# Patient Record
Sex: MALE | Race: WHITE
[De-identification: names, ages, dates, MRNs, and addresses within clinical notes are randomized per-mention and may not be internally consistent; named-entity substitution may affect disease eponyms.]

---

## 2024-03-13 ENCOUNTER — HOSPITAL ENCOUNTER (EMERGENCY)
Dept: HOSPITAL 87 - ER | Age: 54
Discharge: HOME | End: 2024-03-13
Payer: SELF-PAY

## 2024-03-13 VITALS — TEMPERATURE: 98.5 F

## 2024-03-13 VITALS — DIASTOLIC BLOOD PRESSURE: 61 MMHG | RESPIRATION RATE: 18 BRPM | HEART RATE: 66 BPM | SYSTOLIC BLOOD PRESSURE: 123 MMHG

## 2024-03-13 VITALS — OXYGEN SATURATION: 96 %

## 2024-03-13 VITALS — BODY MASS INDEX: 28.93 KG/M2 | HEIGHT: 73 IN | WEIGHT: 218.26 LBS

## 2024-03-13 DIAGNOSIS — F19.90: Primary | ICD-10-CM

## 2024-03-13 DIAGNOSIS — I10: ICD-10-CM

## 2024-03-13 LAB
ALBUMIN SERPL BCP-MCNC: 3.8 G/DL (ref 3.2–4.8)
ALT SERPL W P-5'-P-CCNC: 21 IU/L (ref 10–49)
AST SERPL W P-5'-P-CCNC: 23 IU/L (ref ?–34)
BASOPHILS NFR BLD AUTO: 0.5 % (ref 0–2)
BILIRUB SERPL-MCNC: 0.5 MG/DL (ref 0.1–1)
BUN SERPL-MCNC: 28 MG/DL (ref 9–23)
CALCIUM SERPL-MCNC: 8.1 MG/DL (ref 8.7–10.4)
CARDIAC TROPONIN I PNL SERPL HS: 5 NG/L (ref 3–53)
CHLORIDE SERPL-SCNC: 106 MEQ/L (ref 98–107)
CO2 SERPL-SCNC: 31 MEQ/L (ref 21–32)
CREAT SERPL-MCNC: 1 MG/DL (ref 0.6–1.3)
EOSINOPHIL NFR BLD AUTO: 2 % (ref 0–5)
ERYTHROCYTE [DISTWIDTH] IN BLOOD BY AUTOMATED COUNT: 14.2 % (ref 11.6–14.6)
ETHANOL SERPL-MCNC: < 10 MG/DL (ref ?–10)
GLUCOSE SERPL-MCNC: 92 MG/DL (ref 70–105)
HCT VFR BLD AUTO: 37.5 % (ref 42–52)
HGB BLD-MCNC: 12.8 G/DL (ref 14–18)
LYMPHOCYTES NFR BLD AUTO: 19.4 % (ref 20–50)
MCH RBC QN AUTO: 29.1 PG (ref 28–32)
MCHC RBC AUTO-ENTMCNC: 34.1 G/DL (ref 31–37)
MCV RBC AUTO: 85.4 FL (ref 80–94)
MONOCYTES NFR BLD AUTO: 6.5 % (ref 2–8)
NEUTROPHILS NFR BLD AUTO: 71.6 % (ref 40–76)
PLATELET # BLD AUTO: 163 X1000/UL (ref 130–400)
PMV BLD AUTO: 8.8 FL (ref 7.4–10.4)
POTASSIUM SERPL-SCNC: 3.6 MEQ/L (ref 3.5–5.1)
PROT SERPL-MCNC: 6 G/DL (ref 6–8.3)
RBC # BLD AUTO: 4.39 MILL/UL (ref 4.7–6.1)
SODIUM SERPL-SCNC: 140 MEQ/L (ref 136–145)
WBC # BLD: 7.1 X1000/UL (ref 4.5–11)

## 2024-03-13 PROCEDURE — 93005 ELECTROCARDIOGRAM TRACING: CPT

## 2024-03-13 PROCEDURE — 80320 DRUG SCREEN QUANTALCOHOLS: CPT

## 2024-03-13 PROCEDURE — 71045 X-RAY EXAM CHEST 1 VIEW: CPT

## 2024-03-13 PROCEDURE — 70450 CT HEAD/BRAIN W/O DYE: CPT

## 2024-03-13 PROCEDURE — G0480 DRUG TEST DEF 1-7 CLASSES: HCPCS

## 2024-03-13 PROCEDURE — 82962 GLUCOSE BLOOD TEST: CPT

## 2024-03-13 PROCEDURE — 84484 ASSAY OF TROPONIN QUANT: CPT

## 2024-03-13 PROCEDURE — 36415 COLL VENOUS BLD VENIPUNCTURE: CPT

## 2024-03-13 PROCEDURE — 80053 COMPREHEN METABOLIC PANEL: CPT

## 2024-03-13 PROCEDURE — 85025 COMPLETE CBC W/AUTO DIFF WBC: CPT

## 2024-03-13 PROCEDURE — 99285 EMERGENCY DEPT VISIT HI MDM: CPT

## 2024-03-20 ENCOUNTER — APPOINTMENT (OUTPATIENT)
Dept: RADIOLOGY | Facility: MEDICAL CENTER | Age: 54
End: 2024-03-20
Attending: EMERGENCY MEDICINE
Payer: COMMERCIAL

## 2024-03-20 ENCOUNTER — HOSPITAL ENCOUNTER (EMERGENCY)
Facility: MEDICAL CENTER | Age: 54
End: 2024-03-20
Attending: EMERGENCY MEDICINE
Payer: COMMERCIAL

## 2024-03-20 VITALS
BODY MASS INDEX: 33.8 KG/M2 | SYSTOLIC BLOOD PRESSURE: 157 MMHG | TEMPERATURE: 98 F | DIASTOLIC BLOOD PRESSURE: 89 MMHG | WEIGHT: 271.83 LBS | RESPIRATION RATE: 18 BRPM | HEART RATE: 69 BPM | HEIGHT: 75 IN | OXYGEN SATURATION: 96 %

## 2024-03-20 DIAGNOSIS — S80.02XA CONTUSION OF LEFT KNEE, INITIAL ENCOUNTER: ICD-10-CM

## 2024-03-20 DIAGNOSIS — S80.01XA CONTUSION OF RIGHT KNEE, INITIAL ENCOUNTER: ICD-10-CM

## 2024-03-20 DIAGNOSIS — F07.81 POST CONCUSSIVE SYNDROME: ICD-10-CM

## 2024-03-20 PROCEDURE — 99283 EMERGENCY DEPT VISIT LOW MDM: CPT | Mod: 25

## 2024-03-20 PROCEDURE — 70450 CT HEAD/BRAIN W/O DYE: CPT

## 2024-03-20 PROCEDURE — 73562 X-RAY EXAM OF KNEE 3: CPT | Mod: LT

## 2024-03-20 PROCEDURE — 73562 X-RAY EXAM OF KNEE 3: CPT | Mod: RT

## 2024-03-20 RX ORDER — DONEPEZIL HYDROCHLORIDE 5 MG/1
5 TABLET, FILM COATED ORAL NIGHTLY
Qty: 30 TABLET | Refills: 0 | Status: SHIPPED | OUTPATIENT
Start: 2024-03-20 | End: 2024-04-15

## 2024-03-20 NOTE — ED PROVIDER NOTES
ED Provider Note    CHIEF COMPLAINT  Chief Complaint   Patient presents with    GLF     Pt states he had a fall on 3/13 at the [facility] airport. His foot got stuck in a metal grate and hit his face and knees on the ground. Per family, +LOC for 20min during the incident.   Pt had a scan done at an outlying facility that was negative. Pt's wife has noticed changes in his personality and that the pt is forgetful.      Headache     Pt also endorses having headaches.      Knee Pain     Pt c/o bilateral knee pain since that incident.        EXTERNAL RECORDS REVIEWED  Records from [facility] reviewed, patient CT head at that time was negative.    HPI/ROS    OUTSIDE HISTORIAN(S):  Patient's wife who states that her has been was out of it for multiple minutes, and estimated 20 minutes after the fall.  She also reports he has not been acting like himself, with some mood changes.    Atul Blood Jr. is a 54 y.o. male who presents with ongoing headache after mechanical ground-level fall that occurred approximately 1 week ago while at [facility].  Patient was walking, got his foot stuck on something on the ground causing him to fall onto his bilateral knees and then onto his head.  He struck his head.  Wife reports that he was unconscious for a few minutes and then out of it for 20 minutes.  He was taken to a [facility] where a head CT was checked, and was negative for any bleed.  Patient was discharged home.  He reports he is having ongoing knee pain, he did not have any x-ray of this but has been ambulatory.  He has had a change in personality, reporting that he is significantly more irritable than normal.  He reports a ongoing frontal headache.  He feels simply, out of it.  He denies any associated neck or back pain.  He denies any focal weakness or numbness.  Patient denies any SI or HI.    PAST MEDICAL HISTORY   has a past medical history of Hypertension.    SURGICAL HISTORY  patient denies any  "surgical history    FAMILY HISTORY  History reviewed. No pertinent family history.    SOCIAL HISTORY  Social History     Tobacco Use    Smoking status: Never    Smokeless tobacco: Never   Vaping Use    Vaping Use: Never used   Substance and Sexual Activity    Alcohol use: Not Currently     Comment: rare    Drug use: Never    Sexual activity: Not on file       CURRENT MEDICATIONS  Home Medications       Reviewed by Ernestine Combs R.N. (Registered Nurse) on 03/20/24 at 1449  Med List Status: Not Addressed     Medication Last Dose Status        Patient Vishal Taking any Medications                           ALLERGIES  Allergies   Allergen Reactions    Benylin Cough [Diphenhydramine] Hives    Nickel Hives     Per pt, nickel \"eats\" his skin       PHYSICAL EXAM  VITAL SIGNS: BP (!) 151/100   Pulse 70   Temp 36.7 °C (98.1 °F) (Temporal)   Resp 16   Ht 1.905 m (6' 3\")   Wt 123 kg (271 lb 13.2 oz)   SpO2 96%   BMI 33.98 kg/m²    Physical Exam  Constitutional:       Appearance: Normal appearance.   HENT:      Head: Normocephalic.      Right Ear: Tympanic membrane normal.      Left Ear: Tympanic membrane normal.      Nose: Nose normal.      Mouth/Throat:      Mouth: Mucous membranes are moist.   Eyes:      Extraocular Movements: Extraocular movements intact.      Pupils: Pupils are equal, round, and reactive to light.   Cardiovascular:      Rate and Rhythm: Normal rate and regular rhythm.   Pulmonary:      Effort: Pulmonary effort is normal. No respiratory distress.      Breath sounds: Normal breath sounds. No stridor. No wheezing or rales.   Chest:      Chest wall: No tenderness.   Abdominal:      General: Abdomen is flat. There is no distension.      Palpations: Abdomen is soft. There is no mass.      Tenderness: There is no abdominal tenderness.   Musculoskeletal:      Cervical back: Normal range of motion.      Comments: Cervical, thoracic, lumbar spine clear of any tenderness to palpation.  Bilateral upper " extremities are unremarkable.  Bilateral knees with some tenderness of the patellas.  There is small overlying abrasions here which are healing well.  Full range of motion without significant pain evoked.   Skin:     General: Skin is warm.      Capillary Refill: Capillary refill takes less than 2 seconds.   Neurological:      General: No focal deficit present.      Mental Status: He is alert and oriented to person, place, and time.      Comments: Distal and proximal strength 5/5 in upper and lower extremities. Normal gait. No dysmetria. No sensation deficits. No visual field deficits. Cranial nerves intact.        Psychiatric:         Mood and Affect: Mood normal.           DIAGNOSTIC STUDIES / PROCEDURES      RADIOLOGY  I have independently interpreted the diagnostic imaging associated with this visit and am waiting the final reading from the radiologist.   My preliminary interpretation is as follows: Imaging is all unremarkable.  Radiologist interpretation:   CT-HEAD W/O   Final Result      Head CT without contrast within normal limits. No evidence of acute cerebral infarction, hemorrhage or mass lesion.         DX-KNEE 3 VIEWS RIGHT   Final Result      1.  No fracture detected.   2.  Knee osteoarthritis.      DX-KNEE 3 VIEWS LEFT   Final Result      Postoperative changes of total knee arthroplasty. No acute abnormality.            COURSE & MEDICAL DECISION MAKING        INITIAL ASSESSMENT, COURSE AND PLAN  Care Narrative: Patient here with symptoms of likely concussion.  He is very well-appearing and has a normal neurologic exam.  He and his wife are very concerned that something else may be going on, will check a CT head to ensure there is no delayed bleed.  Will check x-ray of patient's knees given associated patellar pain.  Patient exam is very reassuring, he is not encephalopathic, my suspicion of metabolic etiology is low here and therefore diagnostic labs were deferred.  Imaging all very reassuring.   Patient with likely postconcussive syndrome.  There are some studies which show some improvement with donepezil and therefore I have started this on patient.  He will follow-up closely with her primary care physician, I have given her resources for this and I have also sent referral to neurology.        DISPOSITION AND DISCUSSIONS    Escalation of care considered, and ultimately not performed: Diagnostic labs deferred, see above.    Barriers to care at this time, including but not limited to: Patient does not have established PCP.       FINAL DIAGNOSIS  1. Post concussive syndrome    2. Contusion of left knee, initial encounter    3. Contusion of right knee, initial encounter

## 2024-03-20 NOTE — ED TRIAGE NOTES
"Chief Complaint   Patient presents with    GLF     Pt states he had a fall on 3/13 at the [facility] airport. His foot got stuck in a metal grate and hit his face and knees on the ground. Per family, +LOC for 20min during the incident.   Pt had a scan done at an outlying facility that was negative. Pt's wife has noticed changes in his personality and that the pt is forgetful.      Headache     Pt also endorses having headaches.      Knee Pain     Pt c/o bilateral knee pain since that incident.      Pt ambulatory from lobby with steady gait. Pt also endorses feeling tired all of the time. Pt neurologically intact.  Denies unilateral weakness.    Pt is alert and oriented, speaking in full sentences, follows commands and responds appropriately to questions. Resp are even and unlabored.      Pt placed in lobby. Pt educated on triage process. Pt encouraged to alert staff for any changes.     Patient and staff wearing appropriate PPE.    BP (!) 151/100   Pulse 70   Temp 36.7 °C (98.1 °F) (Temporal)   Resp 16   Ht 1.905 m (6' 3\")   Wt 123 kg (271 lb 13.2 oz)   SpO2 96%      "

## 2024-03-21 NOTE — DISCHARGE INSTRUCTIONS
Your CT thankfully was very reassuring.  Your x-rays were also normal of your knees.  These are likely simple bruises.  Your concussive symptoms should be followed up by a primary care physician, you can follow-up with [facility], [facility] family medicine or a provider through your insurance.  I have sent a referral for neurology for you, please call their office tomorrow and schedule an appointment.  You can take ibuprofen or Excedrin for your migraines.  We can try some donepezil to help with the cognitive issues as this has been shown to help in some people with postconcussive syndrome.  We are here for you if your symptoms worsen

## 2024-03-21 NOTE — ED NOTES
Patient given discharge instructions and education. Verbalizes understanding. Given chance to ask questions. Patient educated on signs and symptoms to returned to ER, Educated patient they can return for any concerning symptoms. One prescriptions sent to pharmacy on file. Education given to patient about medications. Patient states they have their belongings. Denies additional needs at this time. Reports pain is well controlled. Patient monitored every hour and PRN for safety and comfort. Patient ambulatory out of department.

## 2024-04-02 ENCOUNTER — APPOINTMENT (OUTPATIENT)
Dept: NEUROLOGY | Facility: MEDICAL CENTER | Age: 54
End: 2024-04-02
Attending: PSYCHIATRY & NEUROLOGY
Payer: COMMERCIAL

## 2024-04-03 ENCOUNTER — OFFICE VISIT (OUTPATIENT)
Dept: NEUROLOGY | Facility: MEDICAL CENTER | Age: 54
End: 2024-04-03
Attending: SPECIALIST
Payer: COMMERCIAL

## 2024-04-03 ENCOUNTER — TELEPHONE (OUTPATIENT)
Dept: NEUROLOGY | Facility: MEDICAL CENTER | Age: 54
End: 2024-04-03
Payer: COMMERCIAL

## 2024-04-03 VITALS
OXYGEN SATURATION: 97 % | SYSTOLIC BLOOD PRESSURE: 110 MMHG | HEIGHT: 75 IN | BODY MASS INDEX: 32.1 KG/M2 | DIASTOLIC BLOOD PRESSURE: 80 MMHG | TEMPERATURE: 98.6 F | WEIGHT: 258.16 LBS | RESPIRATION RATE: 20 BRPM | HEART RATE: 74 BPM

## 2024-04-03 DIAGNOSIS — S06.0X9A CONCUSSION WITH LOSS OF CONSCIOUSNESS, INITIAL ENCOUNTER: ICD-10-CM

## 2024-04-03 PROCEDURE — 3079F DIAST BP 80-89 MM HG: CPT | Performed by: SPECIALIST

## 2024-04-03 PROCEDURE — 99203 OFFICE O/P NEW LOW 30 MIN: CPT | Performed by: SPECIALIST

## 2024-04-03 PROCEDURE — 3074F SYST BP LT 130 MM HG: CPT | Performed by: SPECIALIST

## 2024-04-03 PROCEDURE — 99211 OFF/OP EST MAY X REQ PHY/QHP: CPT | Performed by: SPECIALIST

## 2024-04-03 RX ORDER — OXYCODONE AND ACETAMINOPHEN 10; 325 MG/1; MG/1
1 TABLET ORAL EVERY 4 HOURS PRN
Status: ON HOLD | COMMUNITY
End: 2024-04-16

## 2024-04-03 NOTE — PROGRESS NOTES
"Subjective     Atul Blood Jr. is a 54 y.o. male who presents with New Patient ( Post concussive syndrome)            HPI  Mr. Atul Blood is a 54-year-old gentleman referred via [facility] emergency room for evaluation of a closed head injury.  He and his wife were returning from a cruise on approximately 12 March.  The patient had vertigo and apparently was refused access on [employer] [facility] [employer].  He went to [employer] and apparently had an issue at the airport.  He was handcuffed but not arrested and taken out of the airport.  He was taken to what he states was a mental health facility for 4 to 5 hours.  He was then released.  The following day he was at [employer] and fell forward striking his head.  According to his wife he had a prolonged.  Of unconsciousness.  He was seen at [facility] and a CT brain scan revealed no acute findings.  Chest x-ray revealed mild cardiomegaly.  He had an EKG revealed a left axis deviation and nursing notes indicate that labs were negative.  The patient was placed on observation status and discharged from the ER.  The wife was upset that they did not have \"postconcussion protocol\" in place.  The patient and his wife state that they report advised as to what to expect after a concussion.  He has had significant difficulty since then.  He has had mood changes.  He has had difficulty with memory.  According to his wife he has been irritable.  He denies prior neurological history.    Past medical history:    1.  History of hypertension    2.  Bilateral knee surgeries    Medications-aricept 5 mg daily started by the ER physician and as needed narcotics.    Allergies-benylin nickel    Social history-he does not smoke or drink    ROS  As above, he reports excessive somnolence, memory problems, emotional lability since the closed head injury.         Objective     /80 (BP Location: Left arm, Patient Position: Sitting, BP Cuff Size: Adult long)   Pulse " "74   Temp 37 °C (98.6 °F) (Temporal)   Resp 20   Ht 1.905 m (6' 3\")   Wt 117 kg (258 lb 2.5 oz)   SpO2 97%   BMI 32.27 kg/m²      Physical Exam  Patient is a cooperative gentleman who is alert.  His speech is fluent.  He was oriented x 3.  His mental status is grossly intact.    HEENT exam reveals to be normocephalic and atraumatic.  Pupils are 3 mm bilaterally and reactive to light.  EOMs are full visual fields are full to confrontation testing.  Optic disks are flat.    Neck is supple.        Neurological Exam  The patient stands without difficulty.  His gait is unremarkable.  Rapid alternating movement of the hands is symmetric.  He has no drift and no dysmetria.    Motor testing revealed intact bulk tone and strength throughout.    Sensory testing is intact to pin.    Reflexes are symmetric and normal active with downgoing toes.    Cranial nerves are unremarkable.  I movement exam unremarkable, facial movement and sensation are symmetric, his palate moves symmetrically, tongue is midline with normal bulk.  Sternocleidomastoid is intact           Assessment & Plan        1. Concussion with loss of consciousness, initial encounter  Mr. Atul Blood is a 54-year-old gentleman who sustained a closed head injury with loss of consciousness on March 13.  He is describing postconcussive symptoms with memory loss, cognitive abnormalities and mood disorder.  I did suggest he have an MRI brain scan to exclude an anatomic deficit.  He will have laboratory studies looking at his thyroid and B12 and a CHEM panel and CBC.  He is referred to see a psychologist to address the mood issues.  He and his wife were very upset that he was taken to an apparent psychiatric facility and observed the day before and not allowed to board flights.  Office is requested records from the facility that saw him.  I will see him back after the above.  - MR-BRAIN-WITH & W/O; Future  - VIT B12,  FOLIC ACID  - THYROID PANEL  - Comp Metabolic " Panel; Future  - CBC WITH DIFFERENTIAL; Future  - Referral to Psychology

## 2024-04-03 NOTE — TELEPHONE ENCOUNTER
NEUROLOGY PATIENT PRE-VISIT PLANNING     Patient was NOT contacted to complete PVP.  Note: Patient will not be contacted if there is no indication to call.     Patient Appointment is scheduled as: New Patient     Is visit type and length scheduled correctly? Yes    [employer] Patient is checked in Patient Demographics? Yes    3.   Is referral attached to visit? Yes    4. Were records received from referring provider? Yes    4. Patient was NOT contacted to have someone accompany them to visit.     5. Is this appointment scheduled as a Hospital Follow-Up?  No    6. Does the patient require any pre procedure or post procedure follow up? No    7. If any orders were placed at last visit or intended to be done for this visit do we have Results/Consult Notes? Yes  Labs - Labs were not ordered at last office visit.  Imaging - Imaging was not ordered at last office visit.  Referrals - No referrals were ordered at last office visit.  Note: If patient appointment is for lab or imaging review and patient did not complete the studies, check with provider if OK to reschedule patient until completed.    8. If patient appointment is for Botox - is order pended for provider? N/A    9. Was Plan Assessment from last Neurology Office Visit Reviewed?  Yes

## 2024-04-10 ENCOUNTER — APPOINTMENT (OUTPATIENT)
Dept: NEUROLOGY | Facility: MEDICAL CENTER | Age: 54
End: 2024-04-10
Payer: COMMERCIAL

## 2024-04-15 ENCOUNTER — HOSPITAL ENCOUNTER (OUTPATIENT)
Facility: MEDICAL CENTER | Age: 54
End: 2024-04-16
Attending: STUDENT IN AN ORGANIZED HEALTH CARE EDUCATION/TRAINING PROGRAM | Admitting: STUDENT IN AN ORGANIZED HEALTH CARE EDUCATION/TRAINING PROGRAM
Payer: COMMERCIAL

## 2024-04-15 ENCOUNTER — APPOINTMENT (OUTPATIENT)
Dept: RADIOLOGY | Facility: MEDICAL CENTER | Age: 54
End: 2024-04-15
Attending: STUDENT IN AN ORGANIZED HEALTH CARE EDUCATION/TRAINING PROGRAM
Payer: COMMERCIAL

## 2024-04-15 DIAGNOSIS — R74.01 TRANSAMINITIS: ICD-10-CM

## 2024-04-15 DIAGNOSIS — R41.89 COGNITIVE CHANGE: ICD-10-CM

## 2024-04-15 PROBLEM — R41.82 ALTERED MENTAL STATUS: Status: ACTIVE | Noted: 2024-04-15

## 2024-04-15 LAB
ALBUMIN SERPL BCP-MCNC: 3.6 G/DL (ref 3.2–4.9)
ALBUMIN/GLOB SERPL: 1.2 G/DL
ALP SERPL-CCNC: 67 U/L (ref 30–99)
ALT SERPL-CCNC: 79 U/L (ref 2–50)
ANION GAP SERPL CALC-SCNC: 10 MMOL/L (ref 7–16)
AST SERPL-CCNC: 159 U/L (ref 12–45)
BASOPHILS # BLD AUTO: 0.4 % (ref 0–1.8)
BASOPHILS # BLD: 0.03 K/UL (ref 0–0.12)
BILIRUB SERPL-MCNC: 0.3 MG/DL (ref 0.1–1.5)
BUN SERPL-MCNC: 24 MG/DL (ref 8–22)
CALCIUM ALBUM COR SERPL-MCNC: 8.3 MG/DL (ref 8.5–10.5)
CALCIUM SERPL-MCNC: 8 MG/DL (ref 8.5–10.5)
CHLORIDE SERPL-SCNC: 106 MMOL/L (ref 96–112)
CO2 SERPL-SCNC: 25 MMOL/L (ref 20–33)
CREAT SERPL-MCNC: 1.17 MG/DL (ref 0.5–1.4)
EOSINOPHIL # BLD AUTO: 0.3 K/UL (ref 0–0.51)
EOSINOPHIL NFR BLD: 4.5 % (ref 0–6.9)
ERYTHROCYTE [DISTWIDTH] IN BLOOD BY AUTOMATED COUNT: 43.7 FL (ref 35.9–50)
ETHANOL BLD-MCNC: <10.1 MG/DL
GFR SERPLBLD CREATININE-BSD FMLA CKD-EPI: 74 ML/MIN/1.73 M 2
GLOBULIN SER CALC-MCNC: 3 G/DL (ref 1.9–3.5)
GLUCOSE SERPL-MCNC: 96 MG/DL (ref 65–99)
HCT VFR BLD AUTO: 40.7 % (ref 42–52)
HGB BLD-MCNC: 13.3 G/DL (ref 14–18)
IMM GRANULOCYTES # BLD AUTO: 0.02 K/UL (ref 0–0.11)
IMM GRANULOCYTES NFR BLD AUTO: 0.3 % (ref 0–0.9)
LYMPHOCYTES # BLD AUTO: 1.6 K/UL (ref 1–4.8)
LYMPHOCYTES NFR BLD: 23.7 % (ref 22–41)
MCH RBC QN AUTO: 27.7 PG (ref 27–33)
MCHC RBC AUTO-ENTMCNC: 32.7 G/DL (ref 32.3–36.5)
MCV RBC AUTO: 84.6 FL (ref 81.4–97.8)
MONOCYTES # BLD AUTO: 0.4 K/UL (ref 0–0.85)
MONOCYTES NFR BLD AUTO: 5.9 % (ref 0–13.4)
NEUTROPHILS # BLD AUTO: 4.39 K/UL (ref 1.82–7.42)
NEUTROPHILS NFR BLD: 65.2 % (ref 44–72)
NRBC # BLD AUTO: 0 K/UL
NRBC BLD-RTO: 0 /100 WBC (ref 0–0.2)
PLATELET # BLD AUTO: 212 K/UL (ref 164–446)
PMV BLD AUTO: 11.3 FL (ref 9–12.9)
POTASSIUM SERPL-SCNC: 4.2 MMOL/L (ref 3.6–5.5)
PROT SERPL-MCNC: 6.6 G/DL (ref 6–8.2)
RBC # BLD AUTO: 4.81 M/UL (ref 4.7–6.1)
SODIUM SERPL-SCNC: 141 MMOL/L (ref 135–145)
TSH SERPL DL<=0.005 MIU/L-ACNC: 0.81 UIU/ML (ref 0.38–5.33)
VIT B12 SERPL-MCNC: 604 PG/ML (ref 211–911)
WBC # BLD AUTO: 6.7 K/UL (ref 4.8–10.8)

## 2024-04-15 PROCEDURE — 700105 HCHG RX REV CODE 258: Performed by: STUDENT IN AN ORGANIZED HEALTH CARE EDUCATION/TRAINING PROGRAM

## 2024-04-15 PROCEDURE — 70450 CT HEAD/BRAIN W/O DYE: CPT

## 2024-04-15 PROCEDURE — 82607 VITAMIN B-12: CPT

## 2024-04-15 PROCEDURE — G0378 HOSPITAL OBSERVATION PER HR: HCPCS

## 2024-04-15 PROCEDURE — 85025 COMPLETE CBC W/AUTO DIFF WBC: CPT

## 2024-04-15 PROCEDURE — 70553 MRI BRAIN STEM W/O & W/DYE: CPT

## 2024-04-15 PROCEDURE — 700117 HCHG RX CONTRAST REV CODE 255: Performed by: STUDENT IN AN ORGANIZED HEALTH CARE EDUCATION/TRAINING PROGRAM

## 2024-04-15 PROCEDURE — 36415 COLL VENOUS BLD VENIPUNCTURE: CPT

## 2024-04-15 PROCEDURE — 82077 ASSAY SPEC XCP UR&BREATH IA: CPT

## 2024-04-15 PROCEDURE — A9579 GAD-BASE MR CONTRAST NOS,1ML: HCPCS | Performed by: STUDENT IN AN ORGANIZED HEALTH CARE EDUCATION/TRAINING PROGRAM

## 2024-04-15 PROCEDURE — 80053 COMPREHEN METABOLIC PANEL: CPT

## 2024-04-15 PROCEDURE — 84443 ASSAY THYROID STIM HORMONE: CPT

## 2024-04-15 PROCEDURE — 99285 EMERGENCY DEPT VISIT HI MDM: CPT

## 2024-04-15 PROCEDURE — 99223 1ST HOSP IP/OBS HIGH 75: CPT | Performed by: STUDENT IN AN ORGANIZED HEALTH CARE EDUCATION/TRAINING PROGRAM

## 2024-04-15 RX ORDER — DIPHENHYDRAMINE HCL 25 MG
25 TABLET ORAL NIGHTLY
Status: ON HOLD | COMMUNITY
End: 2024-04-16

## 2024-04-15 RX ORDER — SODIUM CHLORIDE 9 MG/ML
INJECTION, SOLUTION INTRAVENOUS ONCE
Status: COMPLETED | OUTPATIENT
Start: 2024-04-15 | End: 2024-04-15

## 2024-04-15 RX ADMIN — GADOTERIDOL 20 ML: 279.3 INJECTION, SOLUTION INTRAVENOUS at 22:41

## 2024-04-15 RX ADMIN — SODIUM CHLORIDE: 9 INJECTION, SOLUTION INTRAVENOUS at 21:31

## 2024-04-15 ASSESSMENT — LIFESTYLE VARIABLES
TOTAL SCORE: 0
ON A TYPICAL DAY WHEN YOU DRINK ALCOHOL HOW MANY DRINKS DO YOU HAVE: 0
ALCOHOL_USE: NO
HAVE YOU EVER FELT YOU SHOULD CUT DOWN ON YOUR DRINKING: NO
TOTAL SCORE: 0
EVER HAD A DRINK FIRST THING IN THE MORNING TO STEADY YOUR NERVES TO GET RID OF A HANGOVER: NO
HOW MANY TIMES IN THE PAST YEAR HAVE YOU HAD 5 OR MORE DRINKS IN A DAY: 0
EVER FELT BAD OR GUILTY ABOUT YOUR DRINKING: NO
TOTAL SCORE: 0
CONSUMPTION TOTAL: NEGATIVE
AVERAGE NUMBER OF DAYS PER WEEK YOU HAVE A DRINK CONTAINING ALCOHOL: 0
DOES PATIENT WANT TO STOP DRINKING: NO
HAVE PEOPLE ANNOYED YOU BY CRITICIZING YOUR DRINKING: NO

## 2024-04-15 ASSESSMENT — PATIENT HEALTH QUESTIONNAIRE - PHQ9
2. FEELING DOWN, DEPRESSED, IRRITABLE, OR HOPELESS: SEVERAL DAYS
3. TROUBLE FALLING OR STAYING ASLEEP OR SLEEPING TOO MUCH: NEARLY EVERY DAY
6. FEELING BAD ABOUT YOURSELF - OR THAT YOU ARE A FAILURE OR HAVE LET YOURSELF OR YOUR FAMILY DOWN: SEVERAL DAYS
1. LITTLE INTEREST OR PLEASURE IN DOING THINGS: NEARLY EVERY DAY
4. FEELING TIRED OR HAVING LITTLE ENERGY: NEARLY EVERY DAY
8. MOVING OR SPEAKING SO SLOWLY THAT OTHER PEOPLE COULD HAVE NOTICED. OR THE OPPOSITE, BEING SO FIGETY OR RESTLESS THAT YOU HAVE BEEN MOVING AROUND A LOT MORE THAN USUAL: NEARLY EVERY DAY
9. THOUGHTS THAT YOU WOULD BE BETTER OFF DEAD, OR OF HURTING YOURSELF: NOT AT ALL
SUM OF ALL RESPONSES TO PHQ9 QUESTIONS 1 AND 2: 4
5. POOR APPETITE OR OVEREATING: NEARLY EVERY DAY
SUM OF ALL RESPONSES TO PHQ QUESTIONS 1-9: 20
7. TROUBLE CONCENTRATING ON THINGS, SUCH AS READING THE NEWSPAPER OR WATCHING TELEVISION: NEARLY EVERY DAY

## 2024-04-15 ASSESSMENT — FIBROSIS 4 INDEX: FIB4 SCORE: 4.47

## 2024-04-16 VITALS
BODY MASS INDEX: 31.82 KG/M2 | WEIGHT: 255.95 LBS | RESPIRATION RATE: 17 BRPM | DIASTOLIC BLOOD PRESSURE: 62 MMHG | HEART RATE: 55 BPM | SYSTOLIC BLOOD PRESSURE: 113 MMHG | OXYGEN SATURATION: 95 % | TEMPERATURE: 98 F | HEIGHT: 75 IN

## 2024-04-16 PROBLEM — R41.82 ALTERED MENTAL STATUS: Status: RESOLVED | Noted: 2024-04-15 | Resolved: 2024-04-16

## 2024-04-16 LAB
AMPHET UR QL SCN: NEGATIVE
APPEARANCE UR: CLEAR
BACTERIA #/AREA URNS HPF: NEGATIVE /HPF
BARBITURATES UR QL SCN: NEGATIVE
BENZODIAZ UR QL SCN: POSITIVE
BILIRUB UR QL STRIP.AUTO: NEGATIVE
BZE UR QL SCN: NEGATIVE
CANNABINOIDS UR QL SCN: NEGATIVE
COLOR UR: ABNORMAL
EPI CELLS #/AREA URNS HPF: NEGATIVE /HPF
FENTANYL UR QL: NEGATIVE
GLUCOSE UR STRIP.AUTO-MCNC: NEGATIVE MG/DL
HYALINE CASTS #/AREA URNS LPF: ABNORMAL /LPF
KETONES UR STRIP.AUTO-MCNC: 15 MG/DL
LEUKOCYTE ESTERASE UR QL STRIP.AUTO: ABNORMAL
METHADONE UR QL SCN: NEGATIVE
MICRO URNS: ABNORMAL
NITRITE UR QL STRIP.AUTO: NEGATIVE
OPIATES UR QL SCN: NEGATIVE
OXYCODONE UR QL SCN: NEGATIVE
PCP UR QL SCN: POSITIVE
PH UR STRIP.AUTO: 5.5 [PH] (ref 5–8)
PROPOXYPH UR QL SCN: NEGATIVE
PROT UR QL STRIP: 30 MG/DL
RBC # URNS HPF: ABNORMAL /HPF
RBC UR QL AUTO: NEGATIVE
SP GR UR STRIP.AUTO: 1.03
UROBILINOGEN UR STRIP.AUTO-MCNC: 2 MG/DL
WBC #/AREA URNS HPF: ABNORMAL /HPF

## 2024-04-16 PROCEDURE — G0378 HOSPITAL OBSERVATION PER HR: HCPCS

## 2024-04-16 PROCEDURE — 80307 DRUG TEST PRSMV CHEM ANLYZR: CPT

## 2024-04-16 PROCEDURE — 51798 US URINE CAPACITY MEASURE: CPT

## 2024-04-16 PROCEDURE — 81001 URINALYSIS AUTO W/SCOPE: CPT | Mod: XU

## 2024-04-16 PROCEDURE — 99239 HOSP IP/OBS DSCHRG MGMT >30: CPT | Performed by: HOSPITALIST

## 2024-04-16 NOTE — ED TRIAGE NOTES
Patient to ED with complaint of mood changes, headache, loss of balance, weakness, left side facial droop, blurred vision. He had a fall with head strike in early march. He was seen at a hospital in [place] and then again in [place] for concerns of this changes in mental status. He has has worsening of symptoms since that time. He did see neurology 4/3 and is scheduled for MRI, but wife does not feel safe to keep him at home at this time. He has fallen multiple times, has been physically aggressive to her, has been sleeping 16+ hours and his forgetful.     RN does not some left eye lid droop - they are not sure if that is new.  equal. No drift. Speech clear.

## 2024-04-16 NOTE — H&P
"Hospital Medicine History & Physical Note    Date of Service  4/15/2024    Primary Care Physician  Pcp Pt States None    Consultants  None     Code Status  Full Code    Chief Complaint  Chief Complaint   Patient presents with    Headache    Dizziness    ALOC       History of Presenting Illness  Atul Blood Jr. is a 54 y.o. male past medical history of hypertension who presented 4/15/2024 with change in mental status.  Patient accompanied with wife who reports he has been progressively worsening for the last 1 month.  Patient reports he has been getting increasingly aggressive which is not normal for him.  Patient was seen by neurology where they recommended obtaining MRI of the brain with and without contrast, pending vitamin B12, folic acid and thyroid panel.  She will be admitted to medicine service.    I discussed the plan of care with patient and ERP .    Review of Systems  Review of Systems   Unable to perform ROS: Mental status change       Past Medical History   has a past medical history of Hypertension.    Surgical History   has a past surgical history that includes knee replacement, total (Left); shoulder surgery (Bilateral); and meniscectomy, knee, medial (Right).     Family History  family history is not on file.   Family history reviewed with patient. There is no family history that is pertinent to the chief complaint.     Social History   reports that he has never smoked. He has never used smokeless tobacco. He reports that he does not currently use alcohol. He reports that he does not use drugs.    Allergies  Allergies   Allergen Reactions    Benylin Cough [Diphenhydramine] Hives    Nickel Hives     Per pt, nickel \"eats\" his skin       Medications  Prior to Admission Medications   Prescriptions Last Dose Informant Patient Reported? Taking?   diphenhydrAMINE (BENADRYL) 25 MG Tab 3/12/2024 at PM  Yes Yes   Sig: Take 25 mg by mouth every evening. For Sleep    Indications: Trouble Sleeping "   oxyCODONE-acetaminophen (PERCOCET-10)  MG Tab 4/15/2024 at AFTERNOON Patient Yes Yes   Sig: Take 1 Tablet by mouth every four hours as needed for Severe Pain.      Facility-Administered Medications: None       Physical Exam  Temp:  [36.2 °C (97.2 °F)-36.7 °C (98 °F)] 36.2 °C (97.2 °F)  Pulse:  [51-78] 51  Resp:  [16-19] 16  BP: (102-122)/(59-73) 116/59  SpO2:  [94 %-98 %] 96 %  Blood Pressure: 116/59   Temperature: 36.2 °C (97.2 °F)   Pulse: (!) 51   Respiration: 16   Pulse Oximetry: 96 %       Physical Exam  Vitals and nursing note reviewed.   Constitutional:       Appearance: Normal appearance.   HENT:      Head: Normocephalic and atraumatic.      Right Ear: Tympanic membrane normal.      Left Ear: Tympanic membrane normal.      Nose: Nose normal.      Mouth/Throat:      Mouth: Mucous membranes are moist.      Pharynx: Oropharynx is clear.   Eyes:      Extraocular Movements: Extraocular movements intact.      Pupils: Pupils are equal, round, and reactive to light.   Cardiovascular:      Rate and Rhythm: Regular rhythm. Bradycardia present.      Pulses: Normal pulses.      Heart sounds: Normal heart sounds.   Pulmonary:      Effort: Pulmonary effort is normal.      Breath sounds: Normal breath sounds.   Abdominal:      General: Bowel sounds are normal. There is no distension.      Palpations: Abdomen is soft. There is no mass.   Musculoskeletal:         General: Normal range of motion.      Cervical back: Neck supple.   Skin:     General: Skin is warm.      Capillary Refill: Capillary refill takes less than 2 seconds.   Neurological:      General: No focal deficit present.      Mental Status: He is alert. Mental status is at baseline.   Psychiatric:         Mood and Affect: Mood normal.         Behavior: Behavior normal.         Laboratory:  Recent Labs     04/15/24  1830   WBC 6.7   RBC 4.81   HEMOGLOBIN 13.3*   HEMATOCRIT 40.7*   MCV 84.6   MCH 27.7   MCHC 32.7   RDW 43.7   PLATELETCT 212   MPV 11.3  "    Recent Labs     04/15/24  1830   SODIUM 141   POTASSIUM 4.2   CHLORIDE 106   CO2 25   GLUCOSE 96   BUN 24*   CREATININE 1.17   CALCIUM 8.0*     Recent Labs     04/15/24  1830   ALTSGPT 79*   ASTSGOT 159*   ALKPHOSPHAT 67   TBILIRUBIN 0.3   GLUCOSE 96         No results for input(s): \"NTPROBNP\" in the last 72 hours.      No results for input(s): \"TROPONINT\" in the last 72 hours.    Imaging:  CT-HEAD W/O   Final Result      No evidence of acute intracranial process.         MR-BRAIN-WITH & W/O    (Results Pending)   MR-BRAIN-WITH & W/O    (Results Pending)       no X-Ray or EKG requiring interpretation    Assessment/Plan:  Justification for Admission Status  I anticipate this patient is appropriate for observation status at this time because change in mental status requiring further workup.    Patient will need a Telemetry bed on MEDICAL service .  The need is secondary to see above.    * Altered mental status- (present on admission)  Assessment & Plan  MRI brain with and w/o contrast ordered  Vitamin b12, tsh, folic acid   Neuro checks every 4 hours           VTE prophylaxis: SCDs/TEDs  "

## 2024-04-16 NOTE — PROGRESS NOTES
Report received. Assumed care. Pt AAOx4. Focused Assessment complete. VSS. Denies pain, No other complaints reported; Pt was update for the care for the day. White board updated,Pending MRI results; Dr. Stephens rounded on pt. All question answered. Pt has call light within reach, bed is in the lowest position. Pt has no other needs at this time.

## 2024-04-16 NOTE — PROGRESS NOTES
Pt unable to void overnight after multiple attempts, feelings of being full.    Alesha RAMIRES messaged for st cath order. Pt st cath'd for 475mL and urine sample sent to lab per orders

## 2024-04-16 NOTE — ED NOTES
Med Rec complete per patient and spouse at bedside   Allergies reviewed  Antibiotics in the past 30 days:no  Anticoagulant in past 14 days:no  Ph[facility] utilizes:[facility]      Spouse states pt was seeing a pain management doctor in [place] and that has since change since the beginning of this year. Pt states he has a supply of pain medication from old prescription for when he had knee surgery that he has been taking PRN

## 2024-04-16 NOTE — PROGRESS NOTES
SR/SB and 1st degree AVB, with HR 49-60 and rare PVCs per monitor rooms strip interpretation  0.21/0.10/0.43

## 2024-04-16 NOTE — DISCHARGE SUMMARY
Discharge Summary    CHIEF COMPLAINT ON ADMISSION  Chief Complaint   Patient presents with    Headache    Dizziness    ALOC       Reason for Admission  Follow Up     Admission Date  4/15/2024    CODE STATUS  Full Code    HPI & HOSPITAL COURSE  As per dr sheikh carter    Atul Blood Jr. is a 54 y.o. male past medical history of hypertension who presented 4/15/2024 with change in mental status.  Patient accompanied with wife who reports he has been progressively worsening for the last 1 month.  Patient reports he has been getting increasingly aggressive which is not normal for him.  Patient was seen by neurology where they recommended obtaining MRI of the brain with and without contrast, pending vitamin B12, folic acid and thyroid panel.  She will be admitted to medicine service.    ========================================  Patient reports neurologically intact.  Patient had MRI of the brain with and without contrast did not show any acute abnormality.  Patient was cleared for discharge home with follow-up with PCP.  Patient already has been seen by an outpatient neurologist.  Patient may receive the most benefit from seeing an outpatient psychiatrist/psychologist.  I spoke with the wife on the phone and she is already in the works of setting the patient up with an appointment with a male psychiatrist/psychologist.    I did make a referral to [facility]    I stop patient's Benadryl    Patient instructed to get back to a normal sleeping cycle to be awake during the day and sleep at night    Patient advised to work on getting back to doing his normal ADLs    Therefore, he is discharged in good and stable condition to home with close outpatient follow-up.    The patient recovered much more quickly than anticipated on admission.    Discharge Date  4/16    FOLLOW UP ITEMS POST DISCHARGE      DISCHARGE DIAGNOSES  Principal Problem (Resolved):    Altered mental status (POA: Yes)  Active Problems:    * No active  "hospital problems. *      FOLLOW UP  Future Appointments   Date Time Provider Department Center   4/18/2024 10:30 AM DOUBLE R MRI 1 [facility].     No follow-up provider specified.    MEDICATIONS ON DISCHARGE     Medication List        STOP taking these medications      diphenhydrAMINE 25 MG Tabs  Commonly known as: Benadryl     oxyCODONE-acetaminophen  MG Tabs  Commonly known as: Percocet-10              Allergies  Allergies   Allergen Reactions    Benylin Cough [Diphenhydramine] Hives    Nickel Hives     Per pt, nickel \"eats\" his skin       DIET  Orders Placed This Encounter   Procedures    Diet Order Diet: Regular     Standing Status:   Standing     Number of Occurrences:   1     Order Specific Question:   Diet:     Answer:   Regular [1]       ACTIVITY  As tolerated.  Weight bearing as tolerated    CONSULTATIONS      PROCEDURES  Mri brain    LABORATORY  Lab Results   Component Value Date    SODIUM 141 04/15/2024    POTASSIUM 4.2 04/15/2024    CHLORIDE 106 04/15/2024    CO2 25 04/15/2024    GLUCOSE 96 04/15/2024    BUN 24 (H) 04/15/2024    CREATININE 1.17 04/15/2024        Lab Results   Component Value Date    WBC 6.7 04/15/2024    HEMOGLOBIN 13.3 (L) 04/15/2024    HEMATOCRIT 40.7 (L) 04/15/2024    PLATELETCT 212 04/15/2024        Total time of the discharge process exceeds 40 minutes.  "

## 2024-04-16 NOTE — ASSESSMENT & PLAN NOTE
MRI brain with and w/o contrast ordered  Vitamin b12, tsh, folic acid   Neuro checks every 4 hours

## 2024-04-16 NOTE — PROGRESS NOTES
Discharge instructions reviewed and signed, all questions answered, PIV removed on unit, no new medications.

## 2024-04-16 NOTE — PROGRESS NOTES
4 Eyes Skin Assessment Completed by Jose CHURCH RN and RICHA Epperson-A.    Head WDL  Ears WDL  Nose WDL  Mouth WDL  Neck WDL  Breast/Chest WDL  Shoulder Blades WDL  Spine WDL  (R) Arm/Elbow/Hand WDL  (L) Arm/Elbow/Hand WDL  Abdomen WDL  Groin WDL  Scrotum/Coccyx/Buttocks WDL  (R) Leg WDL  (L) Leg WDL  (R) Heel/Foot/Toe WDL  (L) Heel/Foot/Toe WDL          Devices In Places Tele Box, Blood Pressure Cuff, and Pulse Ox      Interventions In Place N/A    Possible Skin Injury No    Pictures Uploaded Into Epic N/A  Wound Consult Placed N/A  RN Wound Prevention Protocol Ordered No

## 2024-04-16 NOTE — CARE PLAN
The patient is Stable - Low risk of patient condition declining or worsening    Shift Goals  Clinical Goals: Monitor neuro status, MRI, hemodynamic stability, safety  Patient Goals: MRI, rest  Family Goals: MRI      Problem: Pain - Standard  Goal: Alleviation of pain or a reduction in pain to the patient’s comfort goal  Outcome: Progressing     Problem: Knowledge Deficit - Standard  Goal: Patient and family/care givers will demonstrate understanding of plan of care, disease process/condition, diagnostic tests and medications  Outcome: Progressing     Problem: Fall Risk  Goal: Patient will remain free from falls  Outcome: Progressing     Problem: Depression  Goal: Patient and family/caregiver will verbalize accurate information about at least two of the possible causes of depression, three-four of the signs and symptoms of depression  Outcome: Progressing     Plan for MRI and to monitor neuro status and tele overnight

## 2024-04-16 NOTE — ED NOTES
Bedside report received from previous shift.   Assumed patient care. Verified patient identification.  Checked on bed, connected to monitor,  with unlabored respirations. Discussed plan of care.   Vital signs is stable. Denied any new complaints.   Gurney in low position, side rail up for pt safety. Call light within reach.   No needs identified at the moment. Instructed to use call light when needed.    Contraptions: IV on right wrist  Alert and Oriented: X4  Ambulatory: WC  Oxygen: room air  Pending: bed assignment

## 2024-04-16 NOTE — ED NOTES
Pt to  red 11. Pt/family state he has a MRI scheduled for Thursday but does not think he could wait as he is having increased pain and short term memory loss per family

## 2024-04-16 NOTE — ED PROVIDER NOTES
ED Provider Note    CHIEF COMPLAINT  Chief Complaint   Patient presents with    Headache    Dizziness    ALOC       EXTERNAL RECORDS REVIEWED  External ED Note Seen by Dr. Mckinney.  Symptoms attributed to postconcussive symptoms with memory loss, cognitive abnormalities and mood disorder.  Recommended MRI brain to rule out anatomic deficit     HPI/ROS  LIMITATION TO HISTORY   Select: : None  OUTSIDE HISTORIAN(S):  Wife at bedside, adds to history below    Atul Blood Jr. is a 54 y.o. male who presents with altered mental status.  His wife states that he sustained a ground-level fall on 13 March while at [facility].  He was seen in a local emergency department and had a negative CT head.  His wife reports significant behavioral changes since this time.  He has been seen by an outpatient neurologist who wanted to pursue further workup including MRI brain.  His wife says that he has started to become aggressive and his mental status is deteriorating rapidly.  He reports that he is unable to ' keep a thought'.  His wife says that often he does not recognize her and does not respond appropriately with speech either 'slurred or like a dog barking'.  Yesterday he did have multiple episodes of emesis.  He denies any headache or abdominal pain.  He does state that he has a 'fullness' sensation in his head. Wife denies any illness such as fever or systemic symtpoms.  He has been sleeping over 16 hrs a day.     PAST MEDICAL HISTORY   has a past medical history of Hypertension.    SURGICAL HISTORY   has a past surgical history that includes knee replacement, total (Left); shoulder surgery (Bilateral); and meniscectomy, knee, medial (Right).    FAMILY HISTORY  History reviewed. No pertinent family history.    SOCIAL HISTORY  Social History     Tobacco Use    Smoking status: Never    Smokeless tobacco: Never   Vaping Use    Vaping Use: Never used   Substance and Sexual Activity    Alcohol use: Not Currently     Comment: rare  "   Drug use: Never    Sexual activity: Not on file       CURRENT MEDICATIONS  Home Medications       Reviewed by Jose Ray EMT-Basic (Emergency Medical Trauma Technician) on 04/15/24 at 2035  Med List Status: Complete     Medication Last Dose Status   diphenhydrAMINE (BENADRYL) 25 MG Tab 3/12/2024 Active   oxyCODONE-acetaminophen (PERCOCET-10)  MG Tab 4/15/2024 Active                    ALLERGIES  Allergies   Allergen Reactions    Benylin Cough [Diphenhydramine] Hives    Nickel Hives     Per pt, nickel \"eats\" his skin       PHYSICAL EXAM  VITAL SIGNS: /59   Pulse (!) 51   Temp 36.2 °C (97.2 °F) (Temporal)   Resp 16   Ht 1.905 m (6' 3\")   Wt 116 kg (255 lb 15.3 oz)   SpO2 96%   BMI 31.99 kg/m²    Constitutional: Awake and alert . Non toxic  HENT: Normal inspection    Eyes: Normal inspection  Neck: Grossly normal range of motion.  Cardiovascular: Normal heart rate, Normal rhythm.  Symmetric peripheral pulses.   Thorax & Lungs: No respiratory distress, No wheezing, No rales, No rhonchi  Abdomen: Soft, non-distended, nontender to palpation in all 4 quadrants, no mass  Skin: No obvious rash.  Extremities: Warm, well perfused. No clubbing, cyanosis, edema   Neurologic:   A&O x 4. CN II-XII tested and intact. Speech is slurred  Sensation intact to light touch in all extremities.  Motor: Normal tone and bulk. No abnormal movements appreciated. No pronator drift. 5/5 strength to bilateral upper and lower extremities Patient ambulates with a steady gait.  Coordination: Finger to nose intact bilaterally.  Psychiatric: Normal for situation      EKG/LABS  Results for orders placed or performed during the hospital encounter of 04/15/24   CBC WITH DIFFERENTIAL   Result Value Ref Range    WBC 6.7 4.8 - 10.8 K/uL    RBC 4.81 4.70 - 6.10 M/uL    Hemoglobin 13.3 (L) 14.0 - 18.0 g/dL    Hematocrit 40.7 (L) 42.0 - 52.0 %    MCV 84.6 81.4 - 97.8 fL    MCH 27.7 27.0 - 33.0 pg    MCHC 32.7 32.3 - 36.5 g/dL    RDW " 43.7 35.9 - 50.0 fL    Platelet Count 212 164 - 446 K/uL    MPV 11.3 9.0 - 12.9 fL    Neutrophils-Polys 65.20 44.00 - 72.00 %    Lymphocytes 23.70 22.00 - 41.00 %    Monocytes 5.90 0.00 - 13.40 %    Eosinophils 4.50 0.00 - 6.90 %    Basophils 0.40 0.00 - 1.80 %    Immature Granulocytes 0.30 0.00 - 0.90 %    Nucleated RBC 0.00 0.00 - 0.20 /100 WBC    Neutrophils (Absolute) 4.39 1.82 - 7.42 K/uL    Lymphs (Absolute) 1.60 1.00 - 4.80 K/uL    Monos (Absolute) 0.40 0.00 - 0.85 K/uL    Eos (Absolute) 0.30 0.00 - 0.51 K/uL    Baso (Absolute) 0.03 0.00 - 0.12 K/uL    Immature Granulocytes (abs) 0.02 0.00 - 0.11 K/uL    NRBC (Absolute) 0.00 K/uL   COMP METABOLIC PANEL   Result Value Ref Range    Sodium 141 135 - 145 mmol/L    Potassium 4.2 3.6 - 5.5 mmol/L    Chloride 106 96 - 112 mmol/L    Co2 25 20 - 33 mmol/L    Anion Gap 10.0 7.0 - 16.0    Glucose 96 65 - 99 mg/dL    Bun 24 (H) 8 - 22 mg/dL    Creatinine 1.17 0.50 - 1.40 mg/dL    Calcium 8.0 (L) 8.5 - 10.5 mg/dL    Correct Calcium 8.3 (L) 8.5 - 10.5 mg/dL    AST(SGOT) 159 (H) 12 - 45 U/L    ALT(SGPT) 79 (H) 2 - 50 U/L    Alkaline Phosphatase 67 30 - 99 U/L    Total Bilirubin 0.3 0.1 - 1.5 mg/dL    Albumin 3.6 3.2 - 4.9 g/dL    Total Protein 6.6 6.0 - 8.2 g/dL    Globulin 3.0 1.9 - 3.5 g/dL    A-G Ratio 1.2 g/dL   TSH WITH REFLEX TO FT4   Result Value Ref Range    TSH 0.810 0.380 - 5.330 uIU/mL   VITAMIN B12   Result Value Ref Range    Vitamin B12 -True Cobalamin 604 211 - 911 pg/mL   DIAGNOSTIC ALCOHOL   Result Value Ref Range    Diagnostic Alcohol <10.1 <10.1 mg/dL   ESTIMATED GFR   Result Value Ref Range    GFR (CKD-EPI) 74 >60 mL/min/1.73 m 2       I have independently interpreted this EKG    RADIOLOGY/PROCEDURES   I have independently interpreted the diagnostic imaging associated with this visit and am waiting the final reading from the radiologist.   My preliminary interpretation is as follows: No large bleed    Radiologist interpretation:  CT-HEAD W/O   Final  Result      No evidence of acute intracranial process.         MR-BRAIN-WITH & W/O    (Results Pending)       COURSE & MEDICAL DECISION MAKING    ASSESSMENT, COURSE AND PLAN  Care Narrative: This is a 54-year-old male with no chronic medical problems.  He has been seen as an outpatient by Neurology for postconcussive symptoms.  His wife brings him to the ER today due to progressive decline in cognition and increasingly aggressive.  He arrives with normal vital signs he is afebrile.  He has no Signs or symptoms consistent with infection signs or symptoms consistent with infection. I specifically considered but doubt meningitis or encephalitis is afebrile, nontoxic and chronicity of symptoms.  No focal deficit to suggest acute CVA.  Presentation not consistent with seizure.  Labs notable for mild transaminitis but otherwise no electrolyte derangements.     18.25PM  I consulted with, Dr. Felix, Neurology.  I explained in detail the patient's history, exam.  He agreed with inpatient workup.  He recommended repeat CT head in the emergency department to evaluate for slow subdural hemorrhage    Fortunately CT head negative for any acute findings.  Discussed with Dr. Alonso who will admit the patient for further workup.          DISPOSITION AND DISCUSSIONS  I have discussed management of the patient with the following physicians and RAH's:  Dr. Felix, Dr. Alonso    Discussion of management with other [facility] or appropriate source(s): None       FINAL DIAGNOSIS  1. Cognitive change Acute   2. Transaminitis Acute          Electronically signed by: Bruna Posey M.D., 4/15/2024 6:16 PM

## 2024-04-18 ENCOUNTER — APPOINTMENT (OUTPATIENT)
Dept: RADIOLOGY | Facility: MEDICAL CENTER | Age: 54
End: 2024-04-18
Attending: SPECIALIST
Payer: COMMERCIAL

## 2024-04-20 ENCOUNTER — APPOINTMENT (OUTPATIENT)
Dept: RADIOLOGY | Facility: MEDICAL CENTER | Age: 54
End: 2024-04-20
Attending: EMERGENCY MEDICINE
Payer: COMMERCIAL

## 2024-04-20 ENCOUNTER — HOSPITAL ENCOUNTER (EMERGENCY)
Facility: MEDICAL CENTER | Age: 54
End: 2024-04-20
Attending: EMERGENCY MEDICINE
Payer: COMMERCIAL

## 2024-04-20 VITALS
HEART RATE: 62 BPM | SYSTOLIC BLOOD PRESSURE: 140 MMHG | BODY MASS INDEX: 32.02 KG/M2 | TEMPERATURE: 97.8 F | HEIGHT: 75 IN | DIASTOLIC BLOOD PRESSURE: 76 MMHG | WEIGHT: 257.5 LBS | OXYGEN SATURATION: 99 % | RESPIRATION RATE: 19 BRPM

## 2024-04-20 DIAGNOSIS — J98.4 INFLAMMATION OF LUNG: ICD-10-CM

## 2024-04-20 DIAGNOSIS — M54.50 ACUTE MIDLINE LOW BACK PAIN WITHOUT SCIATICA: ICD-10-CM

## 2024-04-20 DIAGNOSIS — R11.2 NAUSEA AND VOMITING, UNSPECIFIED VOMITING TYPE: ICD-10-CM

## 2024-04-20 DIAGNOSIS — R51.9 SEVERE HEADACHE: ICD-10-CM

## 2024-04-20 DIAGNOSIS — F07.81 POSTCONCUSSION SYNDROME: ICD-10-CM

## 2024-04-20 DIAGNOSIS — R59.0 HILAR LYMPHADENOPATHY: ICD-10-CM

## 2024-04-20 LAB
ALBUMIN SERPL BCP-MCNC: 3.8 G/DL (ref 3.2–4.9)
ALBUMIN/GLOB SERPL: 1.3 G/DL
ALP SERPL-CCNC: 70 U/L (ref 30–99)
ALT SERPL-CCNC: 40 U/L (ref 2–50)
ANION GAP SERPL CALC-SCNC: 11 MMOL/L (ref 7–16)
APPEARANCE UR: CLEAR
AST SERPL-CCNC: 27 U/L (ref 12–45)
BACTERIA #/AREA URNS HPF: NEGATIVE /HPF
BASOPHILS # BLD AUTO: 0.5 % (ref 0–1.8)
BASOPHILS # BLD: 0.04 K/UL (ref 0–0.12)
BILIRUB SERPL-MCNC: 0.3 MG/DL (ref 0.1–1.5)
BILIRUB UR QL STRIP.AUTO: NEGATIVE
BUN SERPL-MCNC: 23 MG/DL (ref 8–22)
CALCIUM ALBUM COR SERPL-MCNC: 8.7 MG/DL (ref 8.5–10.5)
CALCIUM SERPL-MCNC: 8.5 MG/DL (ref 8.5–10.5)
CHLORIDE SERPL-SCNC: 103 MMOL/L (ref 96–112)
CO2 SERPL-SCNC: 25 MMOL/L (ref 20–33)
COLOR UR: YELLOW
CREAT SERPL-MCNC: 1.07 MG/DL (ref 0.5–1.4)
EOSINOPHIL # BLD AUTO: 0.32 K/UL (ref 0–0.51)
EOSINOPHIL NFR BLD: 4.4 % (ref 0–6.9)
EPI CELLS #/AREA URNS HPF: NEGATIVE /HPF
ERYTHROCYTE [DISTWIDTH] IN BLOOD BY AUTOMATED COUNT: 42.9 FL (ref 35.9–50)
GFR SERPLBLD CREATININE-BSD FMLA CKD-EPI: 83 ML/MIN/1.73 M 2
GLOBULIN SER CALC-MCNC: 3 G/DL (ref 1.9–3.5)
GLUCOSE SERPL-MCNC: 82 MG/DL (ref 65–99)
GLUCOSE UR STRIP.AUTO-MCNC: NEGATIVE MG/DL
HCT VFR BLD AUTO: 44.4 % (ref 42–52)
HGB BLD-MCNC: 14.6 G/DL (ref 14–18)
HYALINE CASTS #/AREA URNS LPF: ABNORMAL /LPF
IMM GRANULOCYTES # BLD AUTO: 0.04 K/UL (ref 0–0.11)
IMM GRANULOCYTES NFR BLD AUTO: 0.5 % (ref 0–0.9)
KETONES UR STRIP.AUTO-MCNC: ABNORMAL MG/DL
LEUKOCYTE ESTERASE UR QL STRIP.AUTO: ABNORMAL
LIPASE SERPL-CCNC: 12 U/L (ref 11–82)
LYMPHOCYTES # BLD AUTO: 1.41 K/UL (ref 1–4.8)
LYMPHOCYTES NFR BLD: 19.2 % (ref 22–41)
MCH RBC QN AUTO: 27.8 PG (ref 27–33)
MCHC RBC AUTO-ENTMCNC: 32.9 G/DL (ref 32.3–36.5)
MCV RBC AUTO: 84.4 FL (ref 81.4–97.8)
MICRO URNS: ABNORMAL
MONOCYTES # BLD AUTO: 0.41 K/UL (ref 0–0.85)
MONOCYTES NFR BLD AUTO: 5.6 % (ref 0–13.4)
NEUTROPHILS # BLD AUTO: 5.11 K/UL (ref 1.82–7.42)
NEUTROPHILS NFR BLD: 69.8 % (ref 44–72)
NITRITE UR QL STRIP.AUTO: NEGATIVE
NRBC # BLD AUTO: 0 K/UL
NRBC BLD-RTO: 0 /100 WBC (ref 0–0.2)
PH UR STRIP.AUTO: 5.5 [PH] (ref 5–8)
PLATELET # BLD AUTO: 203 K/UL (ref 164–446)
PMV BLD AUTO: 10.9 FL (ref 9–12.9)
POTASSIUM SERPL-SCNC: 4.2 MMOL/L (ref 3.6–5.5)
PROT SERPL-MCNC: 6.8 G/DL (ref 6–8.2)
PROT UR QL STRIP: NEGATIVE MG/DL
RBC # BLD AUTO: 5.26 M/UL (ref 4.7–6.1)
RBC # URNS HPF: ABNORMAL /HPF
RBC UR QL AUTO: NEGATIVE
SODIUM SERPL-SCNC: 139 MMOL/L (ref 135–145)
SP GR UR STRIP.AUTO: >=1.045
UROBILINOGEN UR STRIP.AUTO-MCNC: 1 MG/DL
WBC # BLD AUTO: 7.3 K/UL (ref 4.8–10.8)
WBC #/AREA URNS HPF: ABNORMAL /HPF

## 2024-04-20 PROCEDURE — 74177 CT ABD & PELVIS W/CONTRAST: CPT

## 2024-04-20 PROCEDURE — 700105 HCHG RX REV CODE 258: Performed by: EMERGENCY MEDICINE

## 2024-04-20 PROCEDURE — 700102 HCHG RX REV CODE 250 W/ 637 OVERRIDE(OP): Performed by: EMERGENCY MEDICINE

## 2024-04-20 PROCEDURE — 96374 THER/PROPH/DIAG INJ IV PUSH: CPT

## 2024-04-20 PROCEDURE — 99285 EMERGENCY DEPT VISIT HI MDM: CPT

## 2024-04-20 PROCEDURE — 80053 COMPREHEN METABOLIC PANEL: CPT

## 2024-04-20 PROCEDURE — 71260 CT THORAX DX C+: CPT

## 2024-04-20 PROCEDURE — 72131 CT LUMBAR SPINE W/O DYE: CPT

## 2024-04-20 PROCEDURE — 83690 ASSAY OF LIPASE: CPT

## 2024-04-20 PROCEDURE — 36415 COLL VENOUS BLD VENIPUNCTURE: CPT

## 2024-04-20 PROCEDURE — A9270 NON-COVERED ITEM OR SERVICE: HCPCS | Performed by: EMERGENCY MEDICINE

## 2024-04-20 PROCEDURE — 85025 COMPLETE CBC W/AUTO DIFF WBC: CPT

## 2024-04-20 PROCEDURE — 700111 HCHG RX REV CODE 636 W/ 250 OVERRIDE (IP): Mod: JZ | Performed by: EMERGENCY MEDICINE

## 2024-04-20 PROCEDURE — 700117 HCHG RX CONTRAST REV CODE 255: Performed by: EMERGENCY MEDICINE

## 2024-04-20 PROCEDURE — 96372 THER/PROPH/DIAG INJ SC/IM: CPT

## 2024-04-20 PROCEDURE — 81001 URINALYSIS AUTO W/SCOPE: CPT

## 2024-04-20 RX ORDER — PROCHLORPERAZINE EDISYLATE 5 MG/ML
10 INJECTION INTRAMUSCULAR; INTRAVENOUS ONCE
Status: COMPLETED | OUTPATIENT
Start: 2024-04-20 | End: 2024-04-20

## 2024-04-20 RX ORDER — OXYCODONE HYDROCHLORIDE AND ACETAMINOPHEN 5; 325 MG/1; MG/1
1 TABLET ORAL ONCE
Status: COMPLETED | OUTPATIENT
Start: 2024-04-20 | End: 2024-04-20

## 2024-04-20 RX ORDER — PREDNISONE 20 MG/1
60 TABLET ORAL ONCE
Status: COMPLETED | OUTPATIENT
Start: 2024-04-20 | End: 2024-04-20

## 2024-04-20 RX ORDER — AMOXICILLIN AND CLAVULANATE POTASSIUM 875; 125 MG/1; MG/1
1 TABLET, FILM COATED ORAL 2 TIMES DAILY
Qty: 20 TABLET | Refills: 0 | Status: ACTIVE | OUTPATIENT
Start: 2024-04-20

## 2024-04-20 RX ORDER — SODIUM CHLORIDE 9 MG/ML
1000 INJECTION, SOLUTION INTRAVENOUS ONCE
Status: COMPLETED | OUTPATIENT
Start: 2024-04-20 | End: 2024-04-20

## 2024-04-20 RX ORDER — AMOXICILLIN AND CLAVULANATE POTASSIUM 875; 125 MG/1; MG/1
1 TABLET, FILM COATED ORAL ONCE
Status: COMPLETED | OUTPATIENT
Start: 2024-04-20 | End: 2024-04-20

## 2024-04-20 RX ORDER — HYDROMORPHONE HYDROCHLORIDE 1 MG/ML
1 INJECTION, SOLUTION INTRAMUSCULAR; INTRAVENOUS; SUBCUTANEOUS ONCE
Status: DISCONTINUED | OUTPATIENT
Start: 2024-04-20 | End: 2024-04-20 | Stop reason: HOSPADM

## 2024-04-20 RX ORDER — OXYCODONE HYDROCHLORIDE AND ACETAMINOPHEN 5; 325 MG/1; MG/1
1 TABLET ORAL EVERY 4 HOURS PRN
Qty: 15 TABLET | Refills: 0 | Status: SHIPPED | OUTPATIENT
Start: 2024-04-20 | End: 2024-04-25

## 2024-04-20 RX ORDER — METHYLPREDNISOLONE 4 MG/1
TABLET ORAL
Qty: 1 EACH | Refills: 0 | Status: SHIPPED | OUTPATIENT
Start: 2024-04-20

## 2024-04-20 RX ORDER — ONDANSETRON 4 MG/1
4 TABLET, ORALLY DISINTEGRATING ORAL EVERY 6 HOURS PRN
Qty: 10 TABLET | Refills: 0 | Status: SHIPPED | OUTPATIENT
Start: 2024-04-20

## 2024-04-20 RX ORDER — SUMATRIPTAN 6 MG/.5ML
6 INJECTION, SOLUTION SUBCUTANEOUS ONCE
Status: COMPLETED | OUTPATIENT
Start: 2024-04-20 | End: 2024-04-20

## 2024-04-20 RX ADMIN — AMOXICILLIN AND CLAVULANATE POTASSIUM 1 TABLET: 875; 125 TABLET, FILM COATED ORAL at 19:19

## 2024-04-20 RX ADMIN — SODIUM CHLORIDE 1000 ML: 9 INJECTION, SOLUTION INTRAVENOUS at 15:35

## 2024-04-20 RX ADMIN — SUMATRIPTAN 6 MG: 6 INJECTION SUBCUTANEOUS at 16:12

## 2024-04-20 RX ADMIN — PREDNISONE 60 MG: 20 TABLET ORAL at 19:19

## 2024-04-20 RX ADMIN — PROCHLORPERAZINE EDISYLATE 10 MG: 5 INJECTION INTRAMUSCULAR; INTRAVENOUS at 15:36

## 2024-04-20 RX ADMIN — IOHEXOL 70 ML: 350 INJECTION, SOLUTION INTRAVENOUS at 18:15

## 2024-04-20 RX ADMIN — IOHEXOL 95 ML: 350 INJECTION, SOLUTION INTRAVENOUS at 16:45

## 2024-04-20 RX ADMIN — OXYCODONE HYDROCHLORIDE AND ACETAMINOPHEN 1 TABLET: 5; 325 TABLET ORAL at 19:19

## 2024-04-20 ASSESSMENT — PAIN DESCRIPTION - PAIN TYPE: TYPE: ACUTE PAIN

## 2024-04-20 ASSESSMENT — FIBROSIS 4 INDEX: FIB4 SCORE: 4.47

## 2024-04-20 NOTE — ED PROVIDER NOTES
"ER Provider Note    Scribed for Vinayak Givens D.O. by Ernst Reaves. 4/20/2024  2:57 PM    Primary Care Provider: Pcp Pt States None    CHIEF COMPLAINT  Chief Complaint   Patient presents with    Weakness     The pt reports head injury over a month ago. The pt reports a myriad of symptoms such as head pain, fatigue, oversleeping, abd pain, vomiting, abnormal gait, abnormal urination, short term memory issues, and \"cloudy thoughts\".      HPI/ROS  LIMITATION TO HISTORY   None    OUTSIDE HISTORIAN(S):  Wife present at bedside.     Atul Blood Jr. is a 54 y.o. male who presents to the Emergency Department for evaluation of headache and back pain onset over 1 month ago. The patient reports associated symptoms of emesis after all meals, abdominal pain, neck pain, bowel problems, photophobia, but denies fevers, chills. The patient states he is able to keep protein shakes down. The patient reports a concussion 5 weeks ago and his wife states he has experienced worsening symptoms since that time. The patient reports the patient fell forward, \"knees down, head first.\" The wife states the patient had a bruise to his lower back discovered after he was hospitalized for this fall. Wife reports the patient cannot maintain balance and has experienced memory \"cloudiness,\" short term memory loss, anger that has since improved, and abnormal urination. The patient reports his last bowel movement was 5 weeks ago. He reports taking prescription percocet. The patient denies taking any laxatives. The wife reports the patient was seen by a neurologist as a referral from the first ED visit, but they did not feel that physician has been a good fit.The second neurologist saw nothing on his MRI and ordered for additional diagnostics. She states these results were sent to a neurosurgeon, but the results have not returned yet.     ROS as per HPI.    PAST MEDICAL HISTORY  Past Medical History:   Diagnosis Date    Hypertension  " "    SURGICAL HISTORY  Past Surgical History:   Procedure Laterality Date    KNEE REPLACEMENT, TOTAL Left     MENISCECTOMY, KNEE, MEDIAL Right     SHOULDER SURGERY Bilateral      FAMILY HISTORY  No family history noted.    SOCIAL HISTORY   reports that he has never smoked. He has never used smokeless tobacco. He reports that he does not currently use alcohol. He reports that he does not use drugs.    CURRENT MEDICATIONS  Discharge Medication List as of 4/20/2024  7:26 PM        ALLERGIES  Benylin cough [diphenhydramine] and Nickel    PHYSICAL EXAM  /75   Pulse 62   Temp 36.7 °C (98 °F) (Temporal)   Resp 14   Ht 1.905 m (6' 3\")   Wt 117 kg (257 lb 8 oz)   SpO2 93%   BMI 32.18 kg/m²     General: No acute distress.  HENT: Normocephalic, Mucus membranes are moist.   Chest: Lungs have even and unlabored respirations, Clear to auscultation.   Cardiovascular: Regular rate and regular rhythm, No peripheral cyanosis.  Abdomen: Non-tender, Non distended.  Neuro: Awake, Conversive, Able to relay recent events. A&Ox4 speech clear and concise. Strength and coordination normal.   Back: No area of bony tenderness   Psychiatric: Calm and cooperative.     EXTERNAL RECORDS REVIEWED  Review of patient's past medical records show the patient had a fall on 3/20/24 negative head CT and was admitted on 4/15/24 and had an MRI with recommendation to follow up with psychiatry/psychology.     INITIAL ASSESSMENT  Pt had a fall 5 weeks ago. He was seen in ED 3 times for continued headache pain and clouding of mentaion. He has had CT and MRI that were negative. He has multiple complaints of headache, vomiting all food, and mental cloudiness. He has had multiple imaging studies that show no brain disease so further imaging is not necessary. He has concerns for decreaed bowel movements despite being on narcotics. Will order CT-abdomen     ED Observation Status? No; Patient does not meet criteria for ED Observation.     DIAGNOSTIC " STUDIES    Labs:   Results for orders placed or performed during the hospital encounter of 04/20/24   CBC WITH DIFFERENTIAL   Result Value Ref Range    WBC 7.3 4.8 - 10.8 K/uL    RBC 5.26 4.70 - 6.10 M/uL    Hemoglobin 14.6 14.0 - 18.0 g/dL    Hematocrit 44.4 42.0 - 52.0 %    MCV 84.4 81.4 - 97.8 fL    MCH 27.8 27.0 - 33.0 pg    MCHC 32.9 32.3 - 36.5 g/dL    RDW 42.9 35.9 - 50.0 fL    Platelet Count 203 164 - 446 K/uL    MPV 10.9 9.0 - 12.9 fL    Neutrophils-Polys 69.80 44.00 - 72.00 %    Lymphocytes 19.20 (L) 22.00 - 41.00 %    Monocytes 5.60 0.00 - 13.40 %    Eosinophils 4.40 0.00 - 6.90 %    Basophils 0.50 0.00 - 1.80 %    Immature Granulocytes 0.50 0.00 - 0.90 %    Nucleated RBC 0.00 0.00 - 0.20 /100 WBC    Neutrophils (Absolute) 5.11 1.82 - 7.42 K/uL    Lymphs (Absolute) 1.41 1.00 - 4.80 K/uL    Monos (Absolute) 0.41 0.00 - 0.85 K/uL    Eos (Absolute) 0.32 0.00 - 0.51 K/uL    Baso (Absolute) 0.04 0.00 - 0.12 K/uL    Immature Granulocytes (abs) 0.04 0.00 - 0.11 K/uL    NRBC (Absolute) 0.00 K/uL   COMP METABOLIC PANEL   Result Value Ref Range    Sodium 139 135 - 145 mmol/L    Potassium 4.2 3.6 - 5.5 mmol/L    Chloride 103 96 - 112 mmol/L    Co2 25 20 - 33 mmol/L    Anion Gap 11.0 7.0 - 16.0    Glucose 82 65 - 99 mg/dL    Bun 23 (H) 8 - 22 mg/dL    Creatinine 1.07 0.50 - 1.40 mg/dL    Calcium 8.5 8.5 - 10.5 mg/dL    Correct Calcium 8.7 8.5 - 10.5 mg/dL    AST(SGOT) 27 12 - 45 U/L    ALT(SGPT) 40 2 - 50 U/L    Alkaline Phosphatase 70 30 - 99 U/L    Total Bilirubin 0.3 0.1 - 1.5 mg/dL    Albumin 3.8 3.2 - 4.9 g/dL    Total Protein 6.8 6.0 - 8.2 g/dL    Globulin 3.0 1.9 - 3.5 g/dL    A-G Ratio 1.3 g/dL   LIPASE   Result Value Ref Range    Lipase 12 11 - 82 U/L   URINALYSIS    Specimen: Urine   Result Value Ref Range    Color Yellow     Character Clear     Specific Gravity >=1.045 (A) <1.035    Ph 5.5 5.0 - 8.0    Glucose Negative Negative mg/dL    Ketones Trace (A) Negative mg/dL    Protein Negative Negative mg/dL     Bilirubin Negative Negative    Urobilinogen, Urine 1.0 Negative    Nitrite Negative Negative    Leukocyte Esterase Trace (A) Negative    Occult Blood Negative Negative    Micro Urine Req Microscopic    ESTIMATED GFR   Result Value Ref Range    GFR (CKD-EPI) 83 >60 mL/min/1.73 m 2   URINE MICROSCOPIC (W/UA)   Result Value Ref Range    WBC 0-2 (A) /hpf    RBC 5-10 (A) /hpf    Bacteria Negative None /hpf    Epithelial Cells Negative /hpf    Hyaline Cast 0-2 /lpf     EKG:   I have independently interpreted the above EKG.    Radiology:   The attending emergency physician has independently interpreted the diagnostic imaging associated with this visit and am waiting the final reading from the radiologist.   Preliminary interpretation is as follows: CT L-spine no fracture  Radiologist interpretation:   CT-CHEST (THORAX) WITH   Final Result      1.  Nonspecific mediastinal and hilar lymphadenopathy. This could be benign or malignant.   2.  Trace bilateral pleural effusions.   3.  Minor groundglass nodular opacities in the right upper lobe are nonspecific, possibly minor infectious/inflammatory in etiology.      Fleischner Society pulmonary nodule recommendations:      Ground Glass: CT at 6-12 months to confirm persistence, then CT every 2 years until 5 years      Part Solid: CT at 3-6 months to confirm persistence. If unchanged and solid component remains less than 6 mm, annual CT should be performed for 5 years.      Comments: In practice, part-solid nodules cannot be defined as such until equal to or greater than 6 mm, and nodules less than 6 mm do not usually require follow-up. Persistent part-solid nodules with solid components equal to or greater than 6 mm should    be considered highly suspicious.      Note: These recommendations do not apply to lung cancer screening, patients with immunosuppression, or patients with known primary cancer.      Fleischner Society 2017 Guidelines for Management of Incidentally Detected  Pulmonary Nodules in Adults      CT-LSPINE W/O PLUS RECONS   Final Result      1.  No evidence of fracture of the lumbar spine.      2.  Mild multilevel degenerative disc disease and facet degeneration.      CT-ABDOMEN-PELVIS WITH   Final Result      1.  Mildly enlarged mesenteric lymph nodes which may be chronic or reactive findings.   2.  Fatty atrophy of the pancreas.   3.  Trace pleural effusions.   4.  Bilateral thoracic hilar adenopathy is suggested although incompletely evaluated. This may be further evaluated with contrast chest CT.        COURSE & MEDICAL DECISION MAKING     COURSE AND PLAN  2:57 PM - Patient seen and examined at bedside. CBC with diff, CMP, Lipase, and UA ordered per triage protocol. Discussed plan of care, including medication to treat patient symptoms and diagnostic workup to evaluate. Patient agrees to the plan of care. The patient will be medicated with Compazine 10 mg injection, Imitrex 5 mg injection, and NS infusion 1000 mL. Ordered for CT-Lspine without plus recons, CT-abdomen-pelvis WITH, CBC with diff, CMP, Lipase, and UA to evaluate his symptoms.     5:08 PM - Patient was reevaluated at bedside. Discussed lab and radiology results with the patient. Ordered CT-chest with, urine microscopic to further evaluate. Ordered Dilaudid 1 mg injection.     6:40 PM - Reviewed CT-Abdomen-pelvis with as noted above.    6:44 PM - Paged Pulmonology.     6:47 PM - I discussed the patient's case and the above findings with Dr. Mortensen (Pulmonology) who will follow-up outpatient and have her staff contact the patient.      7:00 PM - Patient was reevaluated at bedside. Discussed radiology results and my conversation with Dr. Mortensen with the patient and informed them patient CT results and it is recommend the patient will follow-up in the office with Dr. Mortensen complaints.      ED Summary: This patient presents approximately 1 month post fall without loss of consciousness, he has a  severe headache, significant low back pain nausea and vomiting with eating.  And mental cloudiness and some forgetfulness.  He has had CAT scan done at the time of the injury had CAT scan done here and was admitted shortly after that for postconcussion syndrome symptoms and he had an MRI of the brain that was negative.  He has had nausea vomiting is got a headache some photophobia, this may be postconcussion headache or migrainous type headache he was medicated for this and his pain was mildly improved.  He is also complaining of low back pain and he has not had a bowel movement for several days with a low back pain CT lumbar spine was considered and ordered, and since he was having some nonspecific abdominal complaints CT was done.  The CT showed some reactive lymph nodes lymphadenitis, but there was concerns of lymph nodes in the chest with recommendation of CT of the chest.    CT of the chest shows lymphadenopathy that may be benign or malignant, he also has inflammatory versus infectious process of the lung and small effusions.  This is concerning for possible cancer.  I spoke with pulmonology Dr. Mortensen and the patient will get rapid follow-up referral has been placed.    As he had is having a lot of pain he is prescribed a narcotic medication for the possible lung infection antibiotics and steroids are being initiated to the steroids may also help his other aches and pains.    He is vomiting is controlled here, his weight shows 2 to 3 pound weight gain over the last week.  So if he is not vomiting significant amounts that would cause him weight loss and his electrolytes are normal also,    This time the patient will be discharged with steroid, and antibiotics for the concerns in the lungs.  And he will be given a narcotic pain medication for pain control and antinausea medication.  Referral has been placed for pulmonology      DISPOSITION AND DISCUSSIONS  I have discussed management of the patient with  the following physicians and RAH's: Dr. Mortensen        In prescribing controlled substances to this patient, I certify that I have obtained and reviewed the medical history of Atul Blood . I have also made a good katie effort to obtain applicable records from other providers who have treated the patient and records did not demonstrate any increased risk of substance abuse that would prevent me from prescribing controlled substances.     I have conducted a physical exam and documented it. I have reviewed Mr. Blood’s prescription history as maintained by the [place] Prescription Monitoring Program.     I have assessed the patient’s risk for abuse, dependency, and addiction using the validated Opioid Risk Tool available at https://www.mdcalc.com/zjgpbn-anqp-sonm-ort-narcotic-abuse.     Given the above, I believe the benefits of controlled substance therapy outweigh the risks. The reasons for prescribing controlled substances include non-narcotic, oral analgesic alternatives have been inadequate for pain control. Accordingly, I have discussed the risk and benefits, treatment plan, and alternative therapies with the patient.     DISPOSITION:  Patient will be discharged home in stable condition.    FOLLOW UP:  Jina Mortensen D.O.  [address] [place] [id number]  [phone]    In 1 week        OUTPATIENT MEDICATIONS:  Discharge Medication List as of 4/20/2024  7:26 PM        START taking these medications    Details   oxyCODONE-acetaminophen (PERCOCET) 5-325 MG Tab Take 1 Tablet by mouth every four hours as needed for Moderate Pain for up to 5 days., Disp-15 Tablet, R-0, Normal      ondansetron (ZOFRAN ODT) 4 MG TABLET DISPERSIBLE Take 1 Tablet by mouth every 6 hours as needed for Nausea/Vomiting., Disp-10 Tablet, R-0, Normal      amoxicillin-clavulanate (AUGMENTIN) 875-125 MG Tab Take 1 Tablet by mouth 2 times a day., Disp-20 Tablet, R-0, Normal      methylPREDNISolone (MEDROL  DOSEPAK) 4 MG Tablet Therapy Pack Use as directed, Disp-1 Each, R-0, Normal              FINAL DIAGNOSIS  1. Nausea and vomiting, unspecified vomiting type    2. Severe headache    3. Postconcussion syndrome    4. Acute midline low back pain without sciatica    5. Inflammation of lung    6. Hilar lymphadenopathy        Ernst SHETH (Scribe), am scribing for, and in the presence of, Vinayak Givens D.O..    Electronically signed by: Ernst Reaves (Scribe), 4/20/2024    IVinayak D.O. personally performed the services described in this documentation, as scribed by Ernst Reaves in my presence, and it is both accurate and complete.     The note accurately reflects work and decisions made by me.  Vinayak Givens D.O.  4/20/2024  8:59 PM

## 2024-04-20 NOTE — ED TRIAGE NOTES
"Chief Complaint   Patient presents with    Weakness     The pt reports head injury over a month ago. The pt reports a myriad of symptoms such as head pain, fatigue, oversleeping, vomiting, abnormal gait, abnormal urination, short term memory issues, and \"cloudy thoughts\".        Pt ambulatory to triage. Pt A&Ox4, for the above complaint.     Pt to lobby . Pt educated on alerting staff in changes to condition. Pt verbalized understanding.     /78   Pulse 81   Temp 36.7 °C (98 °F) (Temporal)   Resp 14   Ht 1.905 m (6' 3\")   Wt 117 kg (257 lb 8 oz)   SpO2 98%   BMI 32.18 kg/m²     "

## 2024-04-21 NOTE — ED NOTES
Verbalized relief of head ache. Now rates as 2/10. Updated with the plan of care on getting CT chest

## 2024-04-21 NOTE — DISCHARGE INSTRUCTIONS
The CT scan of the chest shows some lymph nodes that are concerning for possible cancer, there is also infection or inflammation of areas of the lung.  For this you are being treated with antibiotics and steroid medications.  I spoke with the pulmonologist Dr. Mortensen the phone number and contact information is above the referral has been placed.  Please call this office on Monday to make arrangements for follow-up.    The remainder of your symptoms I believe is related to the fall and probable postconcussion syndrome.  You are being prescribed a nausea medication to assist with that and then narcotic pain medication to assist with other aches and pains.  You can continue to take Motrin with this in addition to using ice packs and warm soaks to assist with pain.  Please follow-up with the primary doctor, and follow-up with pulmonology as directed

## 2024-04-21 NOTE — ED NOTES
Pt has a ride home with his wife. Vs stable. Pt to follow up with pulmonology. Pt discharged with wife.

## 2024-04-21 NOTE — ED NOTES
Noted up at the edge of the bed assisted by wife. Able to urinate using the urinal. Urine sample collected specimen sent to lab.

## 2024-04-22 ENCOUNTER — TELEPHONE (OUTPATIENT)
Dept: SLEEP MEDICINE | Facility: MEDICAL CENTER | Age: 54
End: 2024-04-22
Payer: COMMERCIAL

## 2024-04-22 NOTE — TELEPHONE ENCOUNTER
Patient called earlier this morning and left voicemail on my extension to schedule patients appt for pulmonary.  There is a referral in Epic that had not yet been processed.  Hospitalist told her to call this morning and schedule with Dr. Mortensen.  There was a note from Dr. Mortensen asking to get him scheduled for an EVAL.  I let Dr. Mortensen know we are not contracted with his plan and that he will need to be referred out to an office that is in network.    I also checked with Dr. Mortensen if the eval was for an ION bronchoscopy which is a service unique to [facility], which would allow us to schedule the consult and procedure with the [facility] advantage plan.    Dr. Mortensen stated he would need and eval for an EBUS not an ION bronchoscopy, an EBUS is not unique to [facility] so we would not be able to see him on our office.     I sent a message to the referrals team to please send the referral to an office in network with his plan.     The patients wife call back at 11:32 am on my direct line, she said  gave her the number, to see about getting him scheduled.     I attempted to verify his plan with her and she did eventually verify but did not want to answer my questions as they seemed to be aggravating her and she only wanted me to schedule him.    I let her know we do not take his plan and all the information I have, is that [facility] advantage plan patients cannot waive coverage, when she asked about just being self pay.    I also let her know there was a note on the referral from our referrals department that per his insurance plan all referrals had to come from the primary care.  She said they did not have one here in [place], I tried to suggest reaching out to the prior PCP in [place] to see if they could send a referral, I also multiple times offered to reach out myself if she would like.  She asked if I knew anywhere that would take their plan or let them be self pay, I told her I could give her  the information for the other local pulmonary offices to see if they take his plan but that I did not know if the would still need a referral.    She repeatedly insulted me and berated me, telling me that I was the reason he couldn't get care, that I was telling her he could not be seen anywhere because of insurance and that we wouldn't see him because we wouldn't get paid.  I tried to de-escalate and again offer to do anything I could on my end for her to get a referral from the prior PCP to give her the contact info for the other pulmonary offices in town.    I also let her know as she was getting more upset that I was unable to schedule an appointment with our office that the only other thing I could do was send all the information to my Leadership to see if an exception could be made as he was referred by ED to Neuro and they saw him with his [facility] advantage plan.   She declined all of this and told me she didn't want to see anything and that she would make a complaint.  I will reach out to referrals to see if they can try to send it to somewhere else in town without it being from a Primary care and send encounter to leadership as well.